# Patient Record
Sex: MALE | Race: WHITE | NOT HISPANIC OR LATINO | Employment: FULL TIME | URBAN - METROPOLITAN AREA
[De-identification: names, ages, dates, MRNs, and addresses within clinical notes are randomized per-mention and may not be internally consistent; named-entity substitution may affect disease eponyms.]

---

## 2021-06-06 ENCOUNTER — OFFICE VISIT (OUTPATIENT)
Dept: URGENT CARE | Facility: CLINIC | Age: 31
End: 2021-06-06
Payer: COMMERCIAL

## 2021-06-06 VITALS
SYSTOLIC BLOOD PRESSURE: 108 MMHG | HEART RATE: 91 BPM | WEIGHT: 160 LBS | BODY MASS INDEX: 21.67 KG/M2 | OXYGEN SATURATION: 99 % | RESPIRATION RATE: 14 BRPM | DIASTOLIC BLOOD PRESSURE: 80 MMHG | HEIGHT: 72 IN | TEMPERATURE: 98 F

## 2021-06-06 DIAGNOSIS — H61.22 IMPACTED CERUMEN OF LEFT EAR: ICD-10-CM

## 2021-06-06 PROCEDURE — 99203 OFFICE O/P NEW LOW 30 MIN: CPT | Performed by: EMERGENCY MEDICINE

## 2021-06-06 ASSESSMENT — ENCOUNTER SYMPTOMS
HEADACHES: 0
DIZZINESS: 0
SORE THROAT: 0
FEVER: 0

## 2021-06-06 NOTE — PROGRESS NOTES
Subjective:      Jimbo Simms is a 31 y.o. male who presents with Ear Fullness ((L) side x 1 week, but worse in the last couple days)            Ear Fullness  This is a recurrent problem. The current episode started in the past 7 days. The problem occurs daily. The problem has been rapidly worsening. Pertinent negatives include no congestion, fever, headaches, rash or sore throat. Nothing aggravates the symptoms. Treatments tried: OTC ear drops. The treatment provided no relief.       Review of Systems   Constitutional: Negative for fever.   HENT: Positive for hearing loss. Negative for congestion, ear discharge, ear pain and sore throat.    Skin: Negative for rash.   Neurological: Negative for dizziness and headaches.     History reviewed. No pertinent past medical history.  History reviewed. No pertinent surgical history.   Allergy:  Patient has no known allergies.   No current outpatient medications on file.   family history is not on file.   Social History     Tobacco Use   • Smoking status: Never Smoker   • Smokeless tobacco: Never Used   Substance Use Topics   • Alcohol use: Yes     Comment: a drink a day   • Drug use: Never         Objective:     /80 (BP Location: Left arm, Patient Position: Sitting, BP Cuff Size: Adult long)   Pulse 91   Temp 36.7 °C (98 °F) (Temporal)   Resp 14   Ht 1.829 m (6') Comment: patient stated  Wt 72.6 kg (160 lb)   SpO2 99%   BMI 21.70 kg/m²      Physical Exam  Constitutional:       General: He is not in acute distress.     Appearance: Normal appearance.   HENT:      Head: Normocephalic.      Right Ear: Hearing, tympanic membrane and external ear normal. No drainage, swelling or tenderness.      Left Ear: External ear normal. Decreased hearing noted. No swelling or tenderness. There is impacted cerumen.      Nose: Nose normal.      Mouth/Throat:      Pharynx: Oropharynx is clear.   Lymphadenopathy:      Head:      Right side of head: No preauricular or posterior  auricular adenopathy.      Left side of head: No preauricular or posterior auricular adenopathy.   Skin:     General: Skin is warm and dry.   Neurological:      Mental Status: He is alert.   Psychiatric:         Behavior: Behavior is cooperative.                 Bilateral ear canals, tympanic membranes clear after irrigations.       Assessment/Plan:        1. Impacted cerumen of left ear  Aural irrigations